# Patient Record
(demographics unavailable — no encounter records)

---

## 2020-05-29 NOTE — PDOC.OPDEL
OB Operative/Delivery Note


Delivery Dr/Surgeon: Deo


Pre-Delivery Diagnosis: elective induction


Procedure/Post Delivery Dx: spontaneous vaginal delivery (Head OA, loose nuchal 

cord, shoulders and body easily followed, infant placed on mother's abdomen, 

mouth and nares bulb suctioned, cord clamped and cut.)


Weeks gestation: 39


Anesthesia: none





- Findings


  ** A


Sex: female


Apgar - 1 min: 9


Apgar - 5 min: 9





- Additional Findings/Plan


Placenta delivered: spontaneous


Repaired Obstetrical Laceration: 1st degree (perineal, repaired in standard 

running fashion under local anesthesia, with 3.0 vicryl suture)


Estimated blood loss: 50


Post delivery plan: routine recovery

## 2020-05-29 NOTE — PDOC.LDHP
Labor and Delivery H&P


Chief complaint: scheduled induction


HPI: 





39 weeks IUP. Elective IOL at term.


Current gestational age (weeks): 39


Due date: 20


Dating criteria: last menstrual period, first trimester ultrasound


Grav: 2


Para: 1


OB History Details: 





H/O herpes, on valtrex


Current pregnancy complications: none


Abnormal US findings: No


Current medications: pre- vitamins, other (Valtrex)


Allergies/Adverse Reactions: 


 Allergies











Allergy/AdvReac Type Severity Reaction Status Date / Time


 


No Known Allergies Allergy   Unverified 20 08:41











Social history: none





- Physical Exam


Vital signs reviewed and normal: yes


General: NAD


Heart: RRR


Lungs: CTAB


Abdomen: gravid


Extremeties: no edema


FHT: category 1





- Vaginal Exam


cm dilated: 3


Effacement: 50%


Station: -2





- OB Labs


Blood type: O


RH: positive


Antibody Screen: negative


HIV: negative


RPR: negative


HEPSAg: negative


1 hour GCT: negative


GBS: negative


Urine drug screen: not done


Rubella: immune





- Assessment


L&D Assessment: elective induction at term





- Plan


Plan: admit to L&D, labor augmentation if indicated, informed consent obtained

## 2020-05-29 NOTE — PDOC.EVN
Event Note





- Event Note


Event Note: 





AROM with clear fluid. Tolerated well. No complications. SVE 4/60/-2. Vertex. 

FHT category I. Continue pitocin.

## 2020-05-30 NOTE — PDOC.PP
Post Partum Progress Note


Post Partum Day #: 1


Subjective: 





Doing well. No pain. Did not latch baby overnight, gave bottles. Pumped but didn

't get anything.


PO intake tolerated: yes


Flatus: yes


Ambulation: yes


 Vital Signs (12 hours)











  Temp Pulse Resp BP Pulse Ox


 


 05/30/20 05:28  97.9 F  77  14  109/62  96


 


 05/30/20 02:19  98.7 F  89  16  121/67  97


 


 05/30/20 00:59  98.0 F  82  16  110/62  96








 Weight











Weight                         145 lb

















- Physical Examination


General: NAD


Cardiovascular: no m/r/g, RRR


Respiratory: clear to auscultation bilaterally, non-labored breathing


Abdominal: + bowel sounds, lochia, no distention, appropriately TTP


Neurological: no gross focal deficits


Result Diagrams: 


 05/29/20 07:58





Additional Labs: 


 Post Partum Labs











Blood Type  O POSITIVE   05/29/20  07:58    


 


Hep Bs Antigen  Non-Reactive S/CO (NonReactive)   05/29/20  07:58    











(1) Vaginal delivery


Code(s): O80 - ENCOUNTER FOR FULL-TERM UNCOMPLICATED DELIVERY   Status: Acute   





- Assessment/Plan





Routine PP care


Work with lactation consult today


Home either late tonight (24 hours after delivery, 10pm) or tomorrow AM

## 2020-05-31 NOTE — PDOC.PP
Post Partum Progress Note


Post Partum Day #: 2


Subjective: 





Doing well. No complaints. Lochia normal.


PO intake tolerated: yes


Flatus: yes


Ambulation: yes


 Vital Signs (12 hours)











  Temp Pulse Resp BP


 


 05/31/20 08:57  98.0 F  75  18  120/66








 Weight











Weight                         145 lb

















- Physical Examination


General: NAD


Cardiovascular: no m/r/g, RRR


Respiratory: clear to auscultation bilaterally, non-labored breathing


Abdominal: + bowel sounds, lochia, no distention, appropriately TTP


Neurological: no gross focal deficits


Psychiatric: A&Ox3


Result Diagrams: 


 05/29/20 07:58





Additional Labs: 


 Post Partum Labs











Blood Type  O POSITIVE   05/29/20  07:58    


 


Hep Bs Antigen  Non-Reactive S/CO (NonReactive)   05/29/20  07:58    











(1) Vaginal delivery


Code(s): O80 - ENCOUNTER FOR FULL-TERM UNCOMPLICATED DELIVERY   Status: Acute   





- Assessment/Plan





Routine PP care


D/C home


F/U in 6 weeks